# Patient Record
Sex: FEMALE | URBAN - METROPOLITAN AREA
[De-identification: names, ages, dates, MRNs, and addresses within clinical notes are randomized per-mention and may not be internally consistent; named-entity substitution may affect disease eponyms.]

---

## 2021-05-14 ENCOUNTER — OFFICE VISIT (OUTPATIENT)
Dept: URBAN - METROPOLITAN AREA CLINIC 128 | Facility: CLINIC | Age: 56
End: 2021-05-14
Payer: MEDICARE

## 2021-05-14 DIAGNOSIS — Z12.11 COLON CANCER SCREENING: ICD-10-CM

## 2021-05-14 DIAGNOSIS — R10.11 RUQ ABDOMINAL PAIN: ICD-10-CM

## 2021-05-14 PROCEDURE — 99204 OFFICE O/P NEW MOD 45 MIN: CPT | Performed by: INTERNAL MEDICINE

## 2021-05-14 NOTE — HPI-TODAY'S VISIT:
is a 55 yr old woman with severe right upper quadrant pain- severe - 3 months, Initially was intermittent and now has been constant.  Was taking Motrin for months . She still has the pain and is constant now.  She went to ER 4 days ago.  LFTS and labs were normal except WBC 58388.  CT abdomen showed possible cirrhosis of liver but no other signs of portal HTN. Normal spleen size. Also has mass in left adrenal gland 2.8 cm x 2.5 cm  Hb 14.8 Plts 153  Creat 1.2 Alb 3.9 TB 0.2 AST 10 ALT 11 Lipase 24.   Never had EGd or colonoscopy. Smokes a pack of day.  No prior or current hx of alcohol or drug use.

## 2021-05-14 NOTE — PHYSICAL EXAM GASTROINTESTINAL
Abdomen , soft,  marked RUQ tenderness present, nondistended , no guarding or rigidity , no masses palpable , normal bowel sounds , Liver and Spleen  no hepatosplenomegaly , liver nontender , spleen not palpable

## 2021-05-19 ENCOUNTER — TELEPHONE ENCOUNTER (OUTPATIENT)
Dept: URBAN - METROPOLITAN AREA CLINIC 92 | Facility: CLINIC | Age: 56
End: 2021-05-19

## 2021-05-19 RX ORDER — HYOSCYAMINE SULFATE 0.125 MG
1 TABLET ON THE TONGUE AND ALLOW TO DISSOLVE  AS NEEDED TABLET,DISINTEGRATING ORAL
Qty: 90 | Refills: 2 | OUTPATIENT
Start: 2021-05-21 | End: 2021-08-19

## 2021-05-21 ENCOUNTER — CLAIMS CREATED FROM THE CLAIM WINDOW (OUTPATIENT)
Dept: URBAN - METROPOLITAN AREA CLINIC 4 | Facility: CLINIC | Age: 56
End: 2021-05-21
Payer: MEDICARE

## 2021-05-21 ENCOUNTER — OFFICE VISIT (OUTPATIENT)
Dept: URBAN - METROPOLITAN AREA SURGERY CENTER 31 | Facility: SURGERY CENTER | Age: 56
End: 2021-05-21
Payer: MEDICARE

## 2021-05-21 DIAGNOSIS — R11.2 NAUSEA AND VOMITING, INTRACTABILITY OF VOMITING NOT SPECIFIED, UNSPECIFIED VOMITING TYPE: ICD-10-CM

## 2021-05-21 DIAGNOSIS — K31.89 GASTRIC PERFORATION WITHOUT ULCER: ICD-10-CM

## 2021-05-21 DIAGNOSIS — K31.89 ACQUIRED DEFORMITY OF DUODENUM: ICD-10-CM

## 2021-05-21 DIAGNOSIS — R10.11 ABDOMINAL BURNING SENSATION IN RIGHT UPPER QUADRANT: ICD-10-CM

## 2021-05-21 PROCEDURE — 88305 TISSUE EXAM BY PATHOLOGIST: CPT | Performed by: PATHOLOGY

## 2021-05-21 PROCEDURE — G8907 PT DOC NO EVENTS ON DISCHARG: HCPCS | Performed by: INTERNAL MEDICINE

## 2021-05-21 PROCEDURE — 43239 EGD BIOPSY SINGLE/MULTIPLE: CPT | Performed by: INTERNAL MEDICINE

## 2021-05-21 PROCEDURE — 88312 SPECIAL STAINS GROUP 1: CPT | Performed by: PATHOLOGY

## 2021-05-29 ENCOUNTER — ERX REFILL RESPONSE (OUTPATIENT)
Dept: URBAN - METROPOLITAN AREA CLINIC 92 | Facility: CLINIC | Age: 56
End: 2021-05-29

## 2021-05-29 RX ORDER — HYOSCYAMINE SULFATE 0.12 MG/1
1 TABLET ON THE TONGUE AND ALLOW TO DISSOLVE  AS NEEDED TABLET, ORALLY DISINTEGRATING ORAL
Qty: 90 | Refills: 2 | OUTPATIENT

## 2021-05-29 RX ORDER — HYOSCYAMINE SULFATE 0.12 MG/1
PLACE 1 TABLET ON THE TONGUE AND ALLOW TO DISSOLVE AS NEEDED, FOUR TIMES DAILY TABLET, ORALLY DISINTEGRATING ORAL
Qty: 90 EACH | Refills: 2 | OUTPATIENT

## 2021-06-02 ENCOUNTER — TELEPHONE ENCOUNTER (OUTPATIENT)
Dept: URBAN - METROPOLITAN AREA SURGERY CENTER 30 | Facility: SURGERY CENTER | Age: 56
End: 2021-06-02

## 2021-06-07 ENCOUNTER — TELEPHONE ENCOUNTER (OUTPATIENT)
Dept: URBAN - METROPOLITAN AREA SURGERY CENTER 30 | Facility: SURGERY CENTER | Age: 56
End: 2021-06-07

## 2021-07-01 ENCOUNTER — OFFICE VISIT (OUTPATIENT)
Dept: URBAN - METROPOLITAN AREA CLINIC 19 | Facility: CLINIC | Age: 56
End: 2021-07-01
Payer: MEDICARE

## 2021-07-01 ENCOUNTER — DASHBOARD ENCOUNTERS (OUTPATIENT)
Age: 56
End: 2021-07-01

## 2021-07-01 VITALS
TEMPERATURE: 98.2 F | WEIGHT: 183.6 LBS | HEIGHT: 64 IN | SYSTOLIC BLOOD PRESSURE: 115 MMHG | DIASTOLIC BLOOD PRESSURE: 75 MMHG | BODY MASS INDEX: 31.34 KG/M2 | HEART RATE: 73 BPM

## 2021-07-01 DIAGNOSIS — R19.7 DIARRHEA, UNSPECIFIED TYPE: ICD-10-CM

## 2021-07-01 PROCEDURE — 99213 OFFICE O/P EST LOW 20 MIN: CPT | Performed by: INTERNAL MEDICINE

## 2021-07-01 RX ORDER — HYOSCYAMINE SULFATE 0.12 MG/1
PLACE 1 TABLET ON THE TONGUE AND ALLOW TO DISSOLVE AS NEEDED, FOUR TIMES DAILY TABLET, ORALLY DISINTEGRATING ORAL
Qty: 90 EACH | Refills: 2 | Status: ACTIVE | COMMUNITY

## 2021-07-01 NOTE — HPI-TODAY'S VISIT:
is a 55 yr old woman with severe right upper quadrant pain- severe - 3 months, Initially was intermittent and now has been constant.  Was taking Motrin for months . She still has the pain and is constant now.  She went to ER 4 days ago.  LFTS and labs were normal except WBC 04204.  CT abdomen showed possible cirrhosis of liver but no other signs of portal HTN. Normal spleen size. Also has mass in left adrenal gland 2.8 cm x 2.5 cm  Hb 14.8 Plts 153  Creat 1.2 Alb 3.9 TB 0.2 AST 10 ALT 11 Lipase 24.   Never had EGd or colonoscopy. Smokes a pack of day.  No prior or current hx of alcohol or drug use.  7/1/2021- She had an EGD which was normal except for mild gastriti and gastric and duodenal bx were normal. She now complains of severe  diarrhea for the past 4 weeks that is not getting better.

## 2025-04-23 ENCOUNTER — CLAIMS CREATED FROM THE CLAIM WINDOW (OUTPATIENT)
Dept: URBAN - METROPOLITAN AREA MEDICAL CENTER 25 | Facility: MEDICAL CENTER | Age: 60
End: 2025-04-23

## 2025-04-23 PROCEDURE — 99254 IP/OBS CNSLTJ NEW/EST MOD 60: CPT | Performed by: INTERNAL MEDICINE

## 2025-04-24 ENCOUNTER — CLAIMS CREATED FROM THE CLAIM WINDOW (OUTPATIENT)
Dept: URBAN - METROPOLITAN AREA MEDICAL CENTER 25 | Facility: MEDICAL CENTER | Age: 60
End: 2025-04-24

## 2025-04-24 PROCEDURE — 99232 SBSQ HOSP IP/OBS MODERATE 35: CPT | Performed by: INTERNAL MEDICINE

## 2025-04-25 ENCOUNTER — CLAIMS CREATED FROM THE CLAIM WINDOW (OUTPATIENT)
Dept: URBAN - METROPOLITAN AREA MEDICAL CENTER 25 | Facility: MEDICAL CENTER | Age: 60
End: 2025-04-25

## 2025-04-25 PROCEDURE — 45380 COLONOSCOPY AND BIOPSY: CPT | Performed by: INTERNAL MEDICINE

## 2025-04-25 PROCEDURE — 43235 EGD DIAGNOSTIC BRUSH WASH: CPT | Performed by: INTERNAL MEDICINE

## 2025-04-26 ENCOUNTER — CLAIMS CREATED FROM THE CLAIM WINDOW (OUTPATIENT)
Dept: URBAN - METROPOLITAN AREA MEDICAL CENTER 25 | Facility: MEDICAL CENTER | Age: 60
End: 2025-04-26

## 2025-04-26 PROCEDURE — 45380 COLONOSCOPY AND BIOPSY: CPT | Performed by: INTERNAL MEDICINE
